# Patient Record
Sex: FEMALE | Race: WHITE | NOT HISPANIC OR LATINO | Employment: OTHER | URBAN - METROPOLITAN AREA
[De-identification: names, ages, dates, MRNs, and addresses within clinical notes are randomized per-mention and may not be internally consistent; named-entity substitution may affect disease eponyms.]

---

## 2019-04-17 ENCOUNTER — TELEPHONE (OUTPATIENT)
Dept: NEUROLOGY | Facility: HOSPITAL | Age: 64
End: 2019-04-17

## 2019-04-17 NOTE — TELEPHONE ENCOUNTER
----- Message from Cris Mueller sent at 4/17/2019 11:01 AM CDT -----  Contact: [phone]/self      ----- Message -----  From: Aminta Meade  Sent: 4/15/2019   3:47 PM  To: [facility] Staff    MAUREENW  New patient has questions concerning Dr. Alvarado's specialty and future appointments.

## 2019-04-17 NOTE — TELEPHONE ENCOUNTER
Spoke with potential new NET patient today and she would like to wait until she sees her oncologist tomorrow first before she books an appointment with us.     TI primary found through cscope 2/2016 then followed by an joana patel 3/2016. Currently being worked up for Carcinoid Syndrome and stated her Chromogranin A was elevated and her 5HIAA 24hr urine is also elevated. Asked patient if she was currently on a PPI and she stated that she is on omeprazole. Patient has had recent labs and scans and if she decides to schedule she will send her records her as well.

## 2019-05-22 ENCOUNTER — TELEPHONE (OUTPATIENT)
Dept: NEUROLOGY | Facility: HOSPITAL | Age: 64
End: 2019-05-22

## 2019-05-22 NOTE — TELEPHONE ENCOUNTER
----- Message from Usha Steel RN sent at 5/14/2019  5:00 PM CDT -----  Contact: self / [phone]  Please help.  ----- Message -----  From: Kenisha Calderon  Sent: 5/10/2019  11:39 AM  To: [facility] Staff    She is not ready to send you information for her first appointment, she will send as soon as  they are finish testing her. Please advise         Attn: Keri